# Patient Record
Sex: MALE
[De-identification: names, ages, dates, MRNs, and addresses within clinical notes are randomized per-mention and may not be internally consistent; named-entity substitution may affect disease eponyms.]

---

## 2022-12-31 ENCOUNTER — NURSE TRIAGE (OUTPATIENT)
Dept: OTHER | Facility: CLINIC | Age: 67
End: 2022-12-31

## 2022-12-31 NOTE — TELEPHONE ENCOUNTER
Location of patient: [facility] call from [facility] with [facility]. Viki Alexander MRN: [id number]    Subjective: Caller states \"I have a headache, hard time swallowing due to phlegm, dizziness and back pain. \"     Current Symptoms: + Covid test,  headache, dizziness, cough with phlegm(stated green or brown), and back/neck pain. Associated Symptoms: reduced activity    Pain Severity: 6/10; aching; intermittent    Temperature: none     What has been tried: ibuprofen    Recommended disposition: Call PCP within 24 hours. [address] Kirk, spoke with him concerning patient and patient's request for [address] [place] [address], [facility] advice provided, patient verbalizes understanding; denies any other questions or concerns.     Outcome:  Home care with RX from Dr. Chencho Kirk.          Reason for Disposition   [1] HIGH RISK for severe COVID complications (e.g., weak immune system, age > 59 years, obesity with BMI 30 or higher, pregnant, chronic lung disease or other chronic medical condition) AND [2] COVID symptoms (e.g., cough, fever)  (Exceptions: Already seen by PCP and no new or worsening symptoms.)    Protocols used: Coronavirus (COVID-19) Diagnosed or Suspected-ADULT-